# Patient Record
Sex: FEMALE | Race: BLACK OR AFRICAN AMERICAN | NOT HISPANIC OR LATINO
[De-identification: names, ages, dates, MRNs, and addresses within clinical notes are randomized per-mention and may not be internally consistent; named-entity substitution may affect disease eponyms.]

---

## 2017-09-01 ENCOUNTER — APPOINTMENT (OUTPATIENT)
Dept: OBGYN | Facility: CLINIC | Age: 52
End: 2017-09-01
Payer: COMMERCIAL

## 2017-09-01 VITALS
HEIGHT: 65 IN | RESPIRATION RATE: 20 BRPM | HEART RATE: 64 BPM | WEIGHT: 145 LBS | DIASTOLIC BLOOD PRESSURE: 90 MMHG | BODY MASS INDEX: 24.16 KG/M2 | SYSTOLIC BLOOD PRESSURE: 124 MMHG | OXYGEN SATURATION: 99 %

## 2017-09-01 PROCEDURE — 99396 PREV VISIT EST AGE 40-64: CPT

## 2017-09-07 LAB — HPV HIGH+LOW RISK DNA PNL CVX: NEGATIVE

## 2017-09-11 LAB — CYTOLOGY CVX/VAG DOC THIN PREP: NORMAL

## 2018-09-25 ENCOUNTER — APPOINTMENT (OUTPATIENT)
Dept: OBGYN | Facility: CLINIC | Age: 53
End: 2018-09-25
Payer: COMMERCIAL

## 2018-09-25 VITALS
WEIGHT: 150 LBS | OXYGEN SATURATION: 98 % | BODY MASS INDEX: 24.99 KG/M2 | DIASTOLIC BLOOD PRESSURE: 80 MMHG | HEART RATE: 60 BPM | SYSTOLIC BLOOD PRESSURE: 120 MMHG | HEIGHT: 65 IN

## 2018-09-25 PROCEDURE — 99396 PREV VISIT EST AGE 40-64: CPT

## 2018-09-27 LAB — HPV HIGH+LOW RISK DNA PNL CVX: NOT DETECTED

## 2018-09-30 LAB — CYTOLOGY CVX/VAG DOC THIN PREP: NORMAL

## 2019-07-24 ENCOUNTER — RESULT REVIEW (OUTPATIENT)
Age: 54
End: 2019-07-24

## 2019-07-29 ENCOUNTER — OTHER (OUTPATIENT)
Age: 54
End: 2019-07-29

## 2019-09-24 ENCOUNTER — APPOINTMENT (OUTPATIENT)
Dept: OBGYN | Facility: CLINIC | Age: 54
End: 2019-09-24
Payer: COMMERCIAL

## 2019-09-24 ENCOUNTER — TRANSCRIPTION ENCOUNTER (OUTPATIENT)
Age: 54
End: 2019-09-24

## 2019-09-24 VITALS
DIASTOLIC BLOOD PRESSURE: 70 MMHG | WEIGHT: 150 LBS | HEART RATE: 71 BPM | HEIGHT: 66 IN | BODY MASS INDEX: 24.11 KG/M2 | OXYGEN SATURATION: 98 % | SYSTOLIC BLOOD PRESSURE: 110 MMHG

## 2019-09-24 DIAGNOSIS — N95.2 POSTMENOPAUSAL ATROPHIC VAGINITIS: ICD-10-CM

## 2019-09-24 PROCEDURE — 99396 PREV VISIT EST AGE 40-64: CPT

## 2019-09-24 NOTE — PHYSICAL EXAM
[Awake] : awake [Alert] : alert [Oriented x3] : oriented to person, place, and time [No Lesions] : no genitalia lesions [Labia Majora] : labia major [Normal] : clitoris [Labia Minora] : labia minora [No Bleeding] : there was no active vaginal bleeding [Pap Obtained] : a Pap smear was performed [Soft] :  the cervix was soft [Retroversion] : retroverted [Uterine Adnexae] : were not tender and not enlarged [Acute Distress] : no acute distress [LAD] : no lymphadenopathy [Thyroid Nodule] : no thyroid nodule [Goiter] : no goiter [Mass] : no breast mass [Nipple Discharge] : no nipple discharge [Tender] : non tender [Axillary LAD] : no axillary lymphadenopathy [Distended] : not distended [Depressed Mood] : not depressed [H/Smegaly] : no hepatosplenomegaly [Flat Affect] : affect not flat [Vulvar Atrophy] : no vulvar atrophy [Labia Majora Erythema] : no erythema of the labia majora [Vulvitis] : no vulvitis [Labia Minora Erythema] : no erythema of the labia minora [Erythema] : no erythema [Atrophy] : no atrophy [Cystocele] : no cystocele [Rectocele] : no rectocele [Dry Mucosa] : moist mucosa [Uterine Prolapse] : no uterine prolapse [Discharge] : had no discharge [Erosion] : had no erosions [Motion Tenderness] : there was no cervical motion tenderness [Tenderness] : nontender [Enlarged ___ wks] : not enlarged [Mass ___ cm] : no uterine mass was palpated [Adnexa Tenderness] : were not tender [Ovarian Mass (___ Cm)] : there were no adnexal masses [FreeTextEntry9] : Last colonoscopy 2 - 3 years ago

## 2019-09-24 NOTE — HISTORY OF PRESENT ILLNESS
[Hot Flashes] : hot flashes [Normal Amount/Duration] : was of a normal amount and duration [Regular Cycle Intervals] : periods have been regular [Menarche Age: ____] : age at menarche was [unfilled] [Menstrual Cramps] : menstrual cramps [Menopause Age: ____] : age at menopause was [unfilled] [Sexually Active] : is sexually active [Monogamous] : is monogamous [Male ___] : [unfilled] male [Night Sweats] : no night sweats [Vaginal Itching] : no vaginal itching [Mood Changes] : no mood changes [Dyspareunia] : no dyspareunia [Headache] : no headache [Fatigue] : no fatigue [Weight Change] : no weight change [Irritability] : no irritability [Forgetfulness] : no forgetfulness [Loss of Libido] : no loss of libido [Depression] : no depression [Anxiety] : no anxiety [Spotting Between  Menses] : no spotting between menses

## 2019-09-25 LAB — HPV HIGH+LOW RISK DNA PNL CVX: NOT DETECTED

## 2019-10-02 LAB — CYTOLOGY CVX/VAG DOC THIN PREP: NORMAL

## 2020-10-26 DIAGNOSIS — R92.2 INCONCLUSIVE MAMMOGRAM: ICD-10-CM

## 2021-01-07 ENCOUNTER — APPOINTMENT (OUTPATIENT)
Dept: OBGYN | Facility: CLINIC | Age: 56
End: 2021-01-07
Payer: COMMERCIAL

## 2021-01-07 VITALS
BODY MASS INDEX: 24.11 KG/M2 | DIASTOLIC BLOOD PRESSURE: 70 MMHG | OXYGEN SATURATION: 98 % | HEIGHT: 66 IN | WEIGHT: 150 LBS | SYSTOLIC BLOOD PRESSURE: 110 MMHG | HEART RATE: 56 BPM | TEMPERATURE: 97.2 F

## 2021-01-07 PROCEDURE — 99072 ADDL SUPL MATRL&STAF TM PHE: CPT

## 2021-01-07 PROCEDURE — 99396 PREV VISIT EST AGE 40-64: CPT

## 2021-01-07 NOTE — PHYSICAL EXAM
[Appropriately responsive] : appropriately responsive [Alert] : alert [No Acute Distress] : no acute distress [No Lymphadenopathy] : no lymphadenopathy [Soft] : soft [Non-tender] : non-tender [No Lesions] : no lesions [No Mass] : no mass [Oriented x3] : oriented x3 [Examination Of The Breasts] : a normal appearance [No Discharge] : no discharge [No Masses] : no breast masses were palpable [Labia Majora] : normal [Labia Minora] : normal [Atrophy] : atrophy [No Bleeding] : There was no active vaginal bleeding [Normal] : normal [Tenderness] : nontender [Enlarged ___ wks] : enlarged [unfilled] ~Uweeks [Uterine Adnexae] : normal [FreeTextEntry9] : Last colonoscopy 6 years ago

## 2021-01-08 LAB — HPV HIGH+LOW RISK DNA PNL CVX: NOT DETECTED

## 2021-01-12 LAB — CYTOLOGY CVX/VAG DOC THIN PREP: ABNORMAL

## 2021-07-06 ENCOUNTER — NON-APPOINTMENT (OUTPATIENT)
Age: 56
End: 2021-07-06

## 2021-07-06 DIAGNOSIS — R14.0 ABDOMINAL DISTENSION (GASEOUS): ICD-10-CM

## 2022-01-05 ENCOUNTER — NON-APPOINTMENT (OUTPATIENT)
Age: 57
End: 2022-01-05

## 2022-01-13 ENCOUNTER — NON-APPOINTMENT (OUTPATIENT)
Age: 57
End: 2022-01-13

## 2022-01-13 ENCOUNTER — APPOINTMENT (OUTPATIENT)
Dept: OBGYN | Facility: CLINIC | Age: 57
End: 2022-01-13
Payer: COMMERCIAL

## 2022-01-13 VITALS
HEIGHT: 66 IN | DIASTOLIC BLOOD PRESSURE: 80 MMHG | OXYGEN SATURATION: 99 % | HEART RATE: 72 BPM | WEIGHT: 137 LBS | TEMPERATURE: 97.1 F | BODY MASS INDEX: 22.02 KG/M2 | SYSTOLIC BLOOD PRESSURE: 110 MMHG

## 2022-01-13 PROCEDURE — 99396 PREV VISIT EST AGE 40-64: CPT

## 2022-01-13 RX ORDER — ESTRADIOL 10 UG/1
10 TABLET, FILM COATED VAGINAL
Qty: 24 | Refills: 3 | Status: DISCONTINUED | COMMUNITY
Start: 2017-09-01 | End: 2022-01-13

## 2022-01-13 NOTE — HISTORY OF PRESENT ILLNESS
[FreeTextEntry1] : Check up Without complaint  Recent nephrotic syndrome, minimal  Nephrologist Dr Hernandez S/P Moderna vaccine 3/2021  Denies prior COVID infection\par \par S/P mammo /sono BIRAD 0  F/U studies pending [postmenopausal] : postmenopausal [N] : Patient is not sexually active [LMPDate] : age 46 [PGHxTotal] : 0

## 2022-01-13 NOTE — PHYSICAL EXAM
[Appropriately responsive] : appropriately responsive [Alert] : alert [No Acute Distress] : no acute distress [No Lymphadenopathy] : no lymphadenopathy [Non-tender] : non-tender [Non-distended] : non-distended [No HSM] : No HSM [No Lesions] : no lesions [No Mass] : no mass [Oriented x3] : oriented x3 [Examination Of The Breasts] : a normal appearance [No Discharge] : no discharge [No Masses] : no breast masses were palpable [Labia Majora] : normal [Labia Minora] : normal [No Bleeding] : There was no active vaginal bleeding [Soft] : soft [Normal] : normal [Tenderness] : nontender [Retroversion] : retroverted [Enlarged ___ wks] : enlarged [unfilled] ~Uweeks [Mass ___ cm] : no uterine mass was palpated [Uterine Adnexae] : normal [FreeTextEntry9] : Last colonoscopy

## 2022-01-14 LAB — HPV HIGH+LOW RISK DNA PNL CVX: NOT DETECTED

## 2022-01-24 LAB — CYTOLOGY CVX/VAG DOC THIN PREP: ABNORMAL

## 2022-07-29 ENCOUNTER — NON-APPOINTMENT (OUTPATIENT)
Age: 57
End: 2022-07-29

## 2023-01-19 ENCOUNTER — APPOINTMENT (OUTPATIENT)
Dept: OBGYN | Facility: CLINIC | Age: 58
End: 2023-01-19
Payer: COMMERCIAL

## 2023-01-19 VITALS
DIASTOLIC BLOOD PRESSURE: 72 MMHG | BODY MASS INDEX: 22.34 KG/M2 | OXYGEN SATURATION: 98 % | SYSTOLIC BLOOD PRESSURE: 111 MMHG | HEART RATE: 61 BPM | TEMPERATURE: 97.8 F | WEIGHT: 139 LBS | HEIGHT: 66 IN

## 2023-01-19 PROCEDURE — 99396 PREV VISIT EST AGE 40-64: CPT

## 2023-01-19 RX ORDER — ATORVASTATIN CALCIUM 10 MG/1
10 TABLET, FILM COATED ORAL
Refills: 0 | Status: DISCONTINUED | COMMUNITY
End: 2023-01-19

## 2023-01-19 NOTE — PHYSICAL EXAM
[Chaperone Present] : A chaperone was present in the examining room during all aspects of the physical examination [Appropriately responsive] : appropriately responsive [Alert] : alert [No Acute Distress] : no acute distress [No Murmurs] : no murmurs [Soft] : soft [Non-tender] : non-tender [Non-distended] : non-distended [No HSM] : No HSM [No Lesions] : no lesions [No Mass] : no mass [Oriented x3] : oriented x3 [Examination Of The Breasts] : a normal appearance [No Discharge] : no discharge [No Masses] : no breast masses were palpable [Labia Majora] : normal [Labia Minora] : normal [No Bleeding] : There was no active vaginal bleeding [Normal] : normal [Normal Position] : in a normal position [Tenderness] : nontender [Enlarged ___ wks] : enlarged [unfilled] ~Uweeks [Mass ___ cm] : no uterine mass was palpated [Uterine Adnexae] : normal [FreeTextEntry8] : Last colonoscopy 3 years ago

## 2023-01-19 NOTE — HISTORY OF PRESENT ILLNESS
[Y] : Patient is sexually active [Monogamous (Male Partner)] : is monogamous with a male partner [FreeTextEntry1] : CHeck up  Without complaint  Well since last visit S/P COVID 12/2022  S/P COVID vaccine X 2 ? minimal change disease  Nephrotic [LMPDate] : age 46 [PGHxTotal] : 0

## 2023-02-04 LAB
CYTOLOGY CVX/VAG DOC THIN PREP: ABNORMAL
HPV HIGH+LOW RISK DNA PNL CVX: NOT DETECTED

## 2023-06-07 DIAGNOSIS — Z00.00 ENCOUNTER FOR GENERAL ADULT MEDICAL EXAMINATION W/OUT ABNORMAL FINDINGS: ICD-10-CM

## 2023-07-18 ENCOUNTER — NON-APPOINTMENT (OUTPATIENT)
Age: 58
End: 2023-07-18

## 2024-01-08 ENCOUNTER — NON-APPOINTMENT (OUTPATIENT)
Age: 59
End: 2024-01-08

## 2024-01-11 ENCOUNTER — NON-APPOINTMENT (OUTPATIENT)
Age: 59
End: 2024-01-11

## 2024-01-29 ENCOUNTER — APPOINTMENT (OUTPATIENT)
Dept: OBGYN | Facility: CLINIC | Age: 59
End: 2024-01-29
Payer: COMMERCIAL

## 2024-01-29 VITALS
TEMPERATURE: 97.7 F | HEIGHT: 66 IN | BODY MASS INDEX: 22.18 KG/M2 | DIASTOLIC BLOOD PRESSURE: 70 MMHG | WEIGHT: 138 LBS | OXYGEN SATURATION: 98 % | HEART RATE: 60 BPM | SYSTOLIC BLOOD PRESSURE: 110 MMHG

## 2024-01-29 DIAGNOSIS — D25.9 LEIOMYOMA OF UTERUS, UNSPECIFIED: ICD-10-CM

## 2024-01-29 DIAGNOSIS — Z01.419 ENCOUNTER FOR GYNECOLOGICAL EXAMINATION (GENERAL) (ROUTINE) W/OUT ABNORMAL FINDINGS: ICD-10-CM

## 2024-01-29 PROCEDURE — 99396 PREV VISIT EST AGE 40-64: CPT

## 2024-01-29 RX ORDER — TACROLIMUS 0.5 MG/1
0.5 CAPSULE ORAL
Refills: 0 | Status: DISCONTINUED | COMMUNITY
End: 2024-01-29

## 2024-01-29 NOTE — HISTORY OF PRESENT ILLNESS
[FreeTextEntry1] : Check up  Without complaint S/P Lt breast Bx + for atypical ductal hyperplasia  Referred to Dr Palleschi for surgical consult  Otherwise well since last visit [Y] : Patient is sexually active [Monogamous (Male Partner)] : is monogamous with a male partner [LMPDate] : age 46 [PGHxTotal] : 0

## 2024-01-29 NOTE — PHYSICAL EXAM
[Chaperone Present] : A chaperone was present in the examining room during all aspects of the physical examination [FreeTextEntry1] : MELLY Shabazz [Appropriately responsive] : appropriately responsive [Alert] : alert [No Acute Distress] : no acute distress [No Lymphadenopathy] : no lymphadenopathy [No Murmurs] : no murmurs [Non-tender] : non-tender [Non-distended] : non-distended [No HSM] : No HSM [No Lesions] : no lesions [No Mass] : no mass [Oriented x3] : oriented x3 [Examination Of The Breasts] : a normal appearance [No Discharge] : no discharge [Diffuse Fibrous Tissue In The Left Breast] : fibrocystic changes [No Masses] : no breast masses were palpable [Labia Majora] : normal [Labia Minora] : normal [No Bleeding] : There was no active vaginal bleeding [Soft] : soft [Normal] : normal [Normal Position] : in a normal position [Tenderness] : nontender [Enlarged ___ wks] : enlarged [unfilled] ~Uweeks [Mass ___ cm] : no uterine mass was palpated [Uterine Adnexae] : normal [FreeTextEntry9] : Last colonoscopy 2023

## 2024-02-04 LAB
CYTOLOGY CVX/VAG DOC THIN PREP: NORMAL
HPV HIGH+LOW RISK DNA PNL CVX: NOT DETECTED

## 2024-02-12 NOTE — PHYSICAL EXAM
[Normocephalic] : normocephalic [Atraumatic] : atraumatic [EOMI] : extra ocular movement intact [Sclera nonicteric] : sclera nonicteric [Supple] : supple [No Supraclavicular Adenopathy] : no supraclavicular adenopathy [No Cervical Adenopathy] : no cervical adenopathy [No Thyromegaly] : no thyromegaly [Clear to Auscultation Bilat] : clear to auscultation bilaterally [Normal S1, S2] : normal S1 and S2 [No Gallops] : no gallops [No Rubs] : no pericardial rub [Examined in the supine and seated position] : examined in the supine and seated position [No dominant masses] : no dominant masses in right breast  [No dominant masses] : no dominant masses left breast [No Nipple Retraction] : no left nipple retraction [No Nipple Discharge] : no left nipple discharge [No Axillary Lymphadenopathy] : no left axillary lymphadenopathy [No Edema] : no edema [No Rashes] : no rashes [No Ulceration] : no ulceration

## 2024-02-26 NOTE — HISTORY OF PRESENT ILLNESS
[FreeTextEntry1] : The patient is a 58 year old female here to discuss newly biopsied left ADH. She was referred by Dr. Fermín Bearden (GYN) The patient has no family history of breast cancer.   6/29/2023 Bilateral Mammogram: ([facility]) Clinical Indication- Annual examination and short interval follow-up of probably benign left breast calcifications. No suspicious masses, calcifications or other findings are seen in either breast.  6/29/2023 Bilateral Breast Ultrasound: In the left breast at 2:00 there is 5mm clustered macrocysts. No suspicious abnormalities were seen sonographically in either breast. Overall Impression- Probably benign left breast calcifications, stable since 1/26/2022. Recommended continued short internal follow-up with left magnification views in 6 months. No mammographic evidence of malignancy in the right breast. No sonographic evidence of malignancy in either breast. Recommendation- A 6 month follow-up mammogram of the left breast is recommended. BI-RADS 3.  12/29/2023 Diagnostic Left Mammogram: ([facility]) Clinical Indication- Patient is having a short term follow up of left breast. Impression- Indeterminate calcifications in the upper outer and central left breast. A representative 2 site stereotactic guided biopsy is recommended. BI-RADS 4.  1/8/2024 Left stereotactic biopsies x 2- ([facility]): -9:00 (N8cm) with hourglass shaped clip placement: pathology reveals focal atypical ductal hyperplasia in a background of flat epithelial atypia (FEA) and columnar cell change with associated microcalcifications.  -2:00 (N11cm) with tophat shaped clip placement: pathology benign, columnar cell change with associated microcalcifications, PASH.  The pathology results are concordant with imaging findings. Recommendation- Surgical consultation of the left breast(s) is recommended.

## 2024-02-26 NOTE — ASSESSMENT
[FreeTextEntry1] : Left breast atypical ductal hyperplasia (ADH). I discussed with her that ADH is a risk factor for the presence of breast cancer now, as well as a risk factor for the presence of breast cancer in her future.    1. Advise left Dayna  localization breast biopsy to rule out a potential associated malignancy or further atypia. The procedure was reviewed with her in detail. The indications, alternatives, benefits and risks were discussed with her, including but not limited to bleeding, infection, pain, scar, swelling, numbness, change in breast appearance, and the potential need for additional surgery and further treatment. The breast biopsy and Dayna  booklets and post operative instructions were reviewed and provided to the patient. 2. Bilateral breast MRI with IV contrast: Advise a preop breast MRI for further evaluation 3. Medical oncologist: I discussed with the patient that I will refer her to a medical oncologist postoperatively to discuss taking an anti-hormonal medication such as Tamoxifen to reduce her potential risk of breast cancer in her future.  4. Annual bilateral mammogram and breast ultrasound due 6/2024

## 2024-02-27 ENCOUNTER — APPOINTMENT (OUTPATIENT)
Dept: PLASTIC SURGERY | Facility: CLINIC | Age: 59
End: 2024-02-27
Payer: COMMERCIAL

## 2024-02-27 VITALS
HEIGHT: 65 IN | OXYGEN SATURATION: 99 % | HEART RATE: 69 BPM | WEIGHT: 137 LBS | SYSTOLIC BLOOD PRESSURE: 150 MMHG | BODY MASS INDEX: 22.82 KG/M2 | DIASTOLIC BLOOD PRESSURE: 90 MMHG | TEMPERATURE: 98.2 F

## 2024-02-27 DIAGNOSIS — N60.92 UNSPECIFIED BENIGN MAMMARY DYSPLASIA OF LEFT BREAST: ICD-10-CM

## 2024-02-27 PROCEDURE — 99204 OFFICE O/P NEW MOD 45 MIN: CPT

## 2024-02-27 NOTE — CONSULT LETTER
[Dear  ___] : Dear  [unfilled], [Consult Letter:] : I had the pleasure of evaluating your patient, [unfilled]. [Please see my note below.] : Please see my note below. [Sincerely,] : Sincerely, [FreeTextEntry3] : Susan M. Palleschi, MD, FACS Division of Breast Surgery Director, Breast Surgery [address] [address] (Phone) [phone] (Fax) [phone]  [DrJohn  ___] : Dr. HUGHES

## 2024-03-04 ENCOUNTER — APPOINTMENT (OUTPATIENT)
Dept: MRI IMAGING | Facility: CLINIC | Age: 59
End: 2024-03-04
Payer: COMMERCIAL

## 2024-03-04 PROCEDURE — A9585: CPT

## 2024-03-04 PROCEDURE — 77049 MRI BREAST C-+ W/CAD BI: CPT

## 2024-03-06 ENCOUNTER — NON-APPOINTMENT (OUTPATIENT)
Age: 59
End: 2024-03-06

## 2024-03-14 ENCOUNTER — OUTPATIENT (OUTPATIENT)
Dept: OUTPATIENT SERVICES | Facility: HOSPITAL | Age: 59
LOS: 1 days | End: 2024-03-14
Payer: COMMERCIAL

## 2024-03-14 VITALS
OXYGEN SATURATION: 100 % | TEMPERATURE: 96 F | WEIGHT: 136.69 LBS | SYSTOLIC BLOOD PRESSURE: 133 MMHG | RESPIRATION RATE: 14 BRPM | HEART RATE: 59 BPM | HEIGHT: 65 IN | DIASTOLIC BLOOD PRESSURE: 84 MMHG

## 2024-03-14 DIAGNOSIS — N60.82 OTHER BENIGN MAMMARY DYSPLASIAS OF LEFT BREAST: ICD-10-CM

## 2024-03-14 DIAGNOSIS — N04.9 NEPHROTIC SYNDROME WITH UNSPECIFIED MORPHOLOGIC CHANGES: ICD-10-CM

## 2024-03-14 DIAGNOSIS — N60.89 OTHER BENIGN MAMMARY DYSPLASIAS OF UNSPECIFIED BREAST: ICD-10-CM

## 2024-03-14 DIAGNOSIS — Z01.818 ENCOUNTER FOR OTHER PREPROCEDURAL EXAMINATION: ICD-10-CM

## 2024-03-14 LAB
ANION GAP SERPL CALC-SCNC: 8 MMOL/L — SIGNIFICANT CHANGE UP (ref 5–17)
BUN SERPL-MCNC: 9 MG/DL — SIGNIFICANT CHANGE UP (ref 7–23)
CALCIUM SERPL-MCNC: 9.2 MG/DL — SIGNIFICANT CHANGE UP (ref 8.4–10.5)
CHLORIDE SERPL-SCNC: 104 MMOL/L — SIGNIFICANT CHANGE UP (ref 96–108)
CO2 SERPL-SCNC: 28 MMOL/L — SIGNIFICANT CHANGE UP (ref 22–31)
CREAT SERPL-MCNC: 0.76 MG/DL — SIGNIFICANT CHANGE UP (ref 0.5–1.3)
EGFR: 91 ML/MIN/1.73M2 — SIGNIFICANT CHANGE UP
GLUCOSE SERPL-MCNC: 83 MG/DL — SIGNIFICANT CHANGE UP (ref 70–99)
HCT VFR BLD CALC: 39.8 % — SIGNIFICANT CHANGE UP (ref 34.5–45)
HGB BLD-MCNC: 14.3 G/DL — SIGNIFICANT CHANGE UP (ref 11.5–15.5)
MCHC RBC-ENTMCNC: 32 PG — SIGNIFICANT CHANGE UP (ref 27–34)
MCHC RBC-ENTMCNC: 35.9 GM/DL — SIGNIFICANT CHANGE UP (ref 32–36)
MCV RBC AUTO: 89 FL — SIGNIFICANT CHANGE UP (ref 80–100)
NRBC # BLD: 0 /100 WBCS — SIGNIFICANT CHANGE UP (ref 0–0)
PLATELET # BLD AUTO: 232 K/UL — SIGNIFICANT CHANGE UP (ref 150–400)
POTASSIUM SERPL-MCNC: 4.2 MMOL/L — SIGNIFICANT CHANGE UP (ref 3.5–5.3)
POTASSIUM SERPL-SCNC: 4.2 MMOL/L — SIGNIFICANT CHANGE UP (ref 3.5–5.3)
RBC # BLD: 4.47 M/UL — SIGNIFICANT CHANGE UP (ref 3.8–5.2)
RBC # FLD: 11.6 % — SIGNIFICANT CHANGE UP (ref 10.3–14.5)
SODIUM SERPL-SCNC: 140 MMOL/L — SIGNIFICANT CHANGE UP (ref 135–145)
WBC # BLD: 4.01 K/UL — SIGNIFICANT CHANGE UP (ref 3.8–10.5)
WBC # FLD AUTO: 4.01 K/UL — SIGNIFICANT CHANGE UP (ref 3.8–10.5)

## 2024-03-14 PROCEDURE — G0463: CPT

## 2024-03-14 PROCEDURE — 80048 BASIC METABOLIC PNL TOTAL CA: CPT

## 2024-03-14 PROCEDURE — 36415 COLL VENOUS BLD VENIPUNCTURE: CPT

## 2024-03-14 PROCEDURE — 85027 COMPLETE CBC AUTOMATED: CPT

## 2024-03-14 NOTE — H&P PST ADULT - NSANTHOSAYNRD_GEN_A_CORE
neck 13.5inches/No. MELY screening performed.  STOP BANG Legend: 0-2 = LOW Risk; 3-4 = INTERMEDIATE Risk; 5-8 = HIGH Risk

## 2024-03-14 NOTE — H&P PST ADULT - WEIGHT IN KG
area.  A fever. You have pain or swelling in your neck or arm. You have trouble breathing. Watch closely for changes in your health, and be sure to contact your doctor if:    You have any problems with your line or port. You have received sedation medications today. YOU SHOULD NOT DRIVE FOR 24 HOURS, DO NOT OPERATE HEAVY MACHINERY, DO NOT MAKE ANY LEGAL DECISIONS OR SIGN LEGAL DOCUMENTS FOR 24 HOURS. DO NOT DRINK ALCOHOL, TAKE ANY MEDICATIONS UNLESS PRESCRIBED BY YOUR DOCTOR. IF YOU ARE A CAREGIVER, SOMEONE SHOULD TAKE THAT ROLE FOR 24 HOURS. Side effects of sedation medications and other medications used today have been reviewed  Those side effects can include but are not limited to: dizziness, drowsiness, poor balance, fatigue, sleepiness. Take precautions at home to prevent falls, such as assistance with walking or stairs if allowed and /or when needed or position changes. Allergic or adverse reactions could include nausea, itching, hives, dizziness, or anything else out of the ordinary. Should you experience any of these significant changes, please call [phone] between the hours of 7:30 am and 5:30 pm or [phone] after hours. After hours, ask the  to page the X-ray Technologist, and describe the problem to the technologist. If you are experiencing chest pain, shortness of breath, altered mental state, unusual bleeding or any other emergent symptom you should call 911 immediately.
62

## 2024-03-14 NOTE — H&P PST ADULT - NSICDXPASTMEDICALHX_GEN_ALL_CORE_FT
PAST MEDICAL HISTORY:  Nephrotic syndrome      PAST MEDICAL HISTORY:  Nephrotic syndrome     Other benign mammary dysplasia of left breast

## 2024-03-14 NOTE — H&P PST ADULT - HISTORY OF PRESENT ILLNESS
59yo female with medical h/o Nephrotic syndrome, reports Left breast dysplasia, presents today for PST foe scheduled Left Dayna  Localization Excisional Breast Biopsy on 3/27/2024 57yo female with medical h/o Nephrotic syndrome, reports annual mammogram showed Left breast dysplasia. Pt presents today for PST for scheduled Left Dayna  Localization Excisional Breast Biopsy on 3/27/2024

## 2024-03-14 NOTE — H&P PST ADULT - PROBLEM SELECTOR PLAN 1
Pre-op instructions given. Pt verbalized understanding  Chlorhexidine wash instructions given  Pt instructed to hold all NSAIDs + herbal supplements/vitamins 7 days before surgery  Pending: lab result  Copy ecg obtained -- wnl

## 2024-03-14 NOTE — H&P PST ADULT - ATTENDING COMMENTS
The patient is a 58 year old female who was recently diagnosed with left breast atypical ductal hyperplasia and flat epithelial atypia. She presents to undergo a left [place]  localization excisional breast biopsy.

## 2024-03-25 ENCOUNTER — APPOINTMENT (OUTPATIENT)
Dept: MAMMOGRAPHY | Facility: IMAGING CENTER | Age: 59
End: 2024-03-25
Payer: COMMERCIAL

## 2024-03-25 ENCOUNTER — OUTPATIENT (OUTPATIENT)
Dept: OUTPATIENT SERVICES | Facility: HOSPITAL | Age: 59
LOS: 1 days | End: 2024-03-25
Payer: COMMERCIAL

## 2024-03-25 DIAGNOSIS — N60.92 UNSPECIFIED BENIGN MAMMARY DYSPLASIA OF LEFT BREAST: ICD-10-CM

## 2024-03-25 PROCEDURE — 19281 PERQ DEVICE BREAST 1ST IMAG: CPT

## 2024-03-25 PROCEDURE — 19281 PERQ DEVICE BREAST 1ST IMAG: CPT | Mod: LT

## 2024-03-26 ENCOUNTER — NON-APPOINTMENT (OUTPATIENT)
Age: 59
End: 2024-03-26

## 2024-03-26 ENCOUNTER — TRANSCRIPTION ENCOUNTER (OUTPATIENT)
Age: 59
End: 2024-03-26

## 2024-03-27 ENCOUNTER — APPOINTMENT (OUTPATIENT)
Dept: PLASTIC SURGERY | Facility: HOSPITAL | Age: 59
End: 2024-03-27
Payer: COMMERCIAL

## 2024-03-27 ENCOUNTER — OUTPATIENT (OUTPATIENT)
Dept: OUTPATIENT SERVICES | Facility: HOSPITAL | Age: 59
LOS: 1 days | End: 2024-03-27
Payer: COMMERCIAL

## 2024-03-27 ENCOUNTER — TRANSCRIPTION ENCOUNTER (OUTPATIENT)
Age: 59
End: 2024-03-27

## 2024-03-27 ENCOUNTER — RESULT REVIEW (OUTPATIENT)
Age: 59
End: 2024-03-27

## 2024-03-27 VITALS
RESPIRATION RATE: 16 BRPM | DIASTOLIC BLOOD PRESSURE: 72 MMHG | TEMPERATURE: 98 F | OXYGEN SATURATION: 99 % | SYSTOLIC BLOOD PRESSURE: 112 MMHG | HEART RATE: 58 BPM

## 2024-03-27 VITALS
SYSTOLIC BLOOD PRESSURE: 116 MMHG | WEIGHT: 136.69 LBS | HEART RATE: 76 BPM | HEIGHT: 65 IN | RESPIRATION RATE: 13 BRPM | DIASTOLIC BLOOD PRESSURE: 78 MMHG | TEMPERATURE: 98 F | OXYGEN SATURATION: 100 %

## 2024-03-27 DIAGNOSIS — N60.82 OTHER BENIGN MAMMARY DYSPLASIAS OF LEFT BREAST: ICD-10-CM

## 2024-03-27 DIAGNOSIS — N60.89 OTHER BENIGN MAMMARY DYSPLASIAS OF UNSPECIFIED BREAST: ICD-10-CM

## 2024-03-27 PROCEDURE — 76098 X-RAY EXAM SURGICAL SPECIMEN: CPT

## 2024-03-27 PROCEDURE — 19301 PARTIAL MASTECTOMY: CPT | Mod: LT

## 2024-03-27 PROCEDURE — 88307 TISSUE EXAM BY PATHOLOGIST: CPT | Mod: 26

## 2024-03-27 PROCEDURE — 19125 EXCISION BREAST LESION: CPT | Mod: LT

## 2024-03-27 PROCEDURE — 88360 TUMOR IMMUNOHISTOCHEM/MANUAL: CPT

## 2024-03-27 PROCEDURE — 88307 TISSUE EXAM BY PATHOLOGIST: CPT

## 2024-03-27 PROCEDURE — 88360 TUMOR IMMUNOHISTOCHEM/MANUAL: CPT | Mod: 26

## 2024-03-27 RX ORDER — ONDANSETRON 8 MG/1
4 TABLET, FILM COATED ORAL ONCE
Refills: 0 | Status: DISCONTINUED | OUTPATIENT
Start: 2024-03-27 | End: 2024-03-27

## 2024-03-27 RX ORDER — LISINOPRIL 2.5 MG/1
2 TABLET ORAL
Refills: 0 | DISCHARGE

## 2024-03-27 RX ORDER — SODIUM CHLORIDE 9 MG/ML
1000 INJECTION, SOLUTION INTRAVENOUS
Refills: 0 | Status: DISCONTINUED | OUTPATIENT
Start: 2024-03-27 | End: 2024-03-27

## 2024-03-27 RX ORDER — LISINOPRIL 2.5 MG/1
1 TABLET ORAL
Refills: 0 | DISCHARGE

## 2024-03-27 RX ORDER — OXYCODONE HYDROCHLORIDE 5 MG/1
5 TABLET ORAL ONCE
Refills: 0 | Status: DISCONTINUED | OUTPATIENT
Start: 2024-03-27 | End: 2024-03-27

## 2024-03-27 RX ORDER — FENTANYL CITRATE 50 UG/ML
25 INJECTION INTRAVENOUS
Refills: 0 | Status: DISCONTINUED | OUTPATIENT
Start: 2024-03-27 | End: 2024-03-27

## 2024-03-27 RX ADMIN — SODIUM CHLORIDE 50 MILLILITER(S): 9 INJECTION, SOLUTION INTRAVENOUS at 12:58

## 2024-03-27 NOTE — BRIEF OPERATIVE NOTE - NSICDXBRIEFPOSTOP_GEN_ALL_CORE_FT
POST-OP DIAGNOSIS:  Atypical ductal hyperplasia of left breast 27-Mar-2024 17:26:20  Palleschi, Susan M  Flat epithelial atypia of left breast 27-Mar-2024 17:26:34  Palleschi, Susan M

## 2024-03-27 NOTE — ASU DISCHARGE PLAN (ADULT/PEDIATRIC) - CARE PROVIDER_API CALL
Palleschi, Susan Diana  [address], [address]  [place] [address]  Phone: [phone]  Fax: [phone]  Follow Up Time: 2 weeks

## 2024-03-27 NOTE — ASU DISCHARGE PLAN (ADULT/PEDIATRIC) - ASU DC SPECIAL INSTRUCTIONSFT
You have purple surgical glue over your incision, this will wear away over the next week on its own and helps to keep your incision closed and clean. You may shower tomorrow but do not scrub away glue. Let water run over surgical incision and pat dry.    For pain take Extra Strength Tylenol 500mg 2 tablets every 6 hours as needed for pain    Wear supportive bra day and night for 7-10 days until post op visit. No heavy lifting or gym workout until cleared by surgeon.    Follow up in 2 weeks with Dr. Palleschi, call office to make/confirm appointment

## 2024-03-27 NOTE — BRIEF OPERATIVE NOTE - NSICDXBRIEFPREOP_GEN_ALL_CORE_FT
PRE-OP DIAGNOSIS:  Atypical ductal hyperplasia of left breast 27-Mar-2024 17:25:56  Palleschi, Susan M  Flat epithelial atypia of left breast 27-Mar-2024 17:26:11  Palleschi, Susan M

## 2024-03-27 NOTE — ASU PATIENT PROFILE, ADULT - FALL HARM RISK - UNIVERSAL INTERVENTIONS
Bed in lowest position, wheels locked, appropriate side rails in place/Call bell, personal items and telephone in reach/Instruct patient to call for assistance before getting out of bed or chair/Non-slip footwear when patient is out of bed/[place] to call system/Physically safe environment - no spills, clutter or unnecessary equipment/Purposeful Proactive Rounding/Room/bathroom lighting operational, light cord in reach

## 2024-03-27 NOTE — ASU DISCHARGE PLAN (ADULT/PEDIATRIC) - ACTIVITY LEVEL
No heavy lifting/No sports/gym
Alert-The patient is alert, awake and responds to voice. The patient is oriented to time, place, and person. The triage nurse is able to obtain subjective information.

## 2024-03-27 NOTE — ASU DISCHARGE PLAN (ADULT/PEDIATRIC) - NS MD DC FALL RISK RISK
For information on Fall & Injury Prevention, visit: [email or web address] OR  [email or web address] OR  https://www.cdc.gov/steadi/patient.html

## 2024-03-28 ENCOUNTER — NON-APPOINTMENT (OUTPATIENT)
Age: 59
End: 2024-03-28

## 2024-03-28 PROCEDURE — 76098 X-RAY EXAM SURGICAL SPECIMEN: CPT | Mod: 26

## 2024-04-03 ENCOUNTER — NON-APPOINTMENT (OUTPATIENT)
Age: 59
End: 2024-04-03

## 2024-04-03 LAB — SURGICAL PATHOLOGY STUDY: SIGNIFICANT CHANGE UP

## 2024-04-04 ENCOUNTER — NON-APPOINTMENT (OUTPATIENT)
Age: 59
End: 2024-04-04

## 2024-04-05 ENCOUNTER — NON-APPOINTMENT (OUTPATIENT)
Age: 59
End: 2024-04-05

## 2024-04-09 ENCOUNTER — APPOINTMENT (OUTPATIENT)
Dept: PLASTIC SURGERY | Facility: CLINIC | Age: 59
End: 2024-04-09
Payer: COMMERCIAL

## 2024-04-09 ENCOUNTER — NON-APPOINTMENT (OUTPATIENT)
Age: 59
End: 2024-04-09

## 2024-04-09 VITALS
DIASTOLIC BLOOD PRESSURE: 88 MMHG | WEIGHT: 137 LBS | OXYGEN SATURATION: 100 % | TEMPERATURE: 97.9 F | BODY MASS INDEX: 22.82 KG/M2 | HEART RATE: 69 BPM | HEIGHT: 65 IN | SYSTOLIC BLOOD PRESSURE: 139 MMHG

## 2024-04-09 PROCEDURE — 99024 POSTOP FOLLOW-UP VISIT: CPT

## 2024-04-09 NOTE — CONSULT LETTER
[Dear  ___] : Dear  [unfilled], [Consult Letter:] : I had the pleasure of evaluating your patient, [unfilled]. [Please see my note below.] : Please see my note below. [Consult Closing:] : Thank you very much for allowing me to participate in the care of this patient.  If you have any questions, please do not hesitate to contact me. [Sincerely,] : Sincerely, [DrJohn  ___] : Dr. HUGHES [FreeTextEntry3] : Susan M. Palleschi, MD, FACS Division of Breast Surgery Director, Breast Surgery [address] [address] (Phone) [phone] (Fax) [phone]

## 2024-04-09 NOTE — ASSESSMENT
[FreeTextEntry1] : S/P left 9:00 excisional breast biopsy for ADH with final pathology revealing DCIS, ER+/ME+. Margins close but negative.  I had an extensive discussion with the patient informing her that this is a Stage 0 cancer which has an excellent prognosis with appropriate treatment.   1. Pathology reviewed with patient, copy provided 2. The surgical treatment options of a left completion mastectomy vs. bilateral total mastectomies were discussed in detail. The indications, alternatives, benefits and risks were discussed, including but not limited to bleeding, infection, pain, scar, swelling, numbness and change in breast appearance. The breast cancer booklet was reviewed and provided to the patient. She would prefer to continue with breast conservation. 3.. Genetic testing: She would like to meet with [facility] cancer genetics for a consultation and more information 4. Reconstruction: discussed with patient option of reconstruction if she undergoes mastectomy(ies).  5. Medical Oncology: I will refer her to Miguel 6. Radiation Oncology: I will refer her to [facility]. 7. Follow up office visit due 6/2024 8. Advised monthly self breast examinations and advised her to contact me if she has any concerns. 9. Annual bilateral mammogram and breast ultrasound due 6-7/2024.

## 2024-04-09 NOTE — PHYSICAL EXAM
[Normocephalic] : normocephalic [Atraumatic] : atraumatic [EOMI] : extra ocular movement intact [Sclera nonicteric] : sclera nonicteric [Supple] : supple [Examined in the supine and seated position] : examined in the supine and seated position [No dominant masses] : no dominant masses left breast [No Nipple Retraction] : no left nipple retraction [No Nipple Discharge] : no left nipple discharge [de-identified] : Incision C/D/I, no erythema or warmth, no evidence of infection, no palpable seroma/hematoma.

## 2024-04-09 NOTE — HISTORY OF PRESENT ILLNESS
[FreeTextEntry1] : The patient is a 58 year old female here for a post op appt (Date of surgery: 3/27/2024).  She was referred by Dr. Fermín Bearden (GYN) She has no family history of breast or ovarian cancer. 6/29/2023 Bilateral Mammogram: ([facility]) Clinical Indication- Annual examination and short interval follow-up of probably benign left breast calcifications. No suspicious masses, calcifications or other findings are seen in either breast. 6/29/2023 Bilateral Breast Ultrasound: In the left breast at 2:00 there is 5mm clustered macrocysts. No suspicious abnormalities were seen sonographically in either breast. Overall Impression- Probably benign left breast calcifications, stable since 1/26/2022. Recommended continued short internal follow-up with left magnification views in 6 months. No mammographic evidence of malignancy in the right breast. No sonographic evidence of malignancy in either breast. Recommendation- A 6 month follow-up mammogram of the left breast is recommended. BI-RADS 3. 12/29/2023 Diagnostic Left Mammogram: ([facility]) Clinical Indication- Patient is having a short term follow up of left breast. Impression- Indeterminate calcifications in the upper outer and central left breast. A representative 2 site stereotactic guided biopsy is recommended. BI-RADS 4. 1/8/2024 Left stereotactic biopsies x 2- ([facility]): -9:00 (N8cm) with hourglass shaped clip placement: pathology reveals focal atypical ductal hyperplasia in a background of flat epithelial atypia (FEA) and columnar cell change with associated microcalcifications. -2:00 (N11cm) with tophat shaped clip placement: pathology benign, columnar cell change with associated microcalcifications, PASH. The pathology results are concordant with imaging findings. Recommendation- Surgical consultation of the left breast(s) is recommended. 3/4/2024 Bilateral Breast MRI: ([facility]) Clinical Indication- 58 years old female status post two benign stereotactic biopsies of calcifications in the left breast in January 2024 at 9:00 8 cm from the nipple revealing ADH and FEA (hourglass shaped clip) and at 2:00 11 cm from the nipple revealing columnar cell change and PASH (top hat shaped clip). Patient now presents for breast MRI for presurgical planning. No personal or family history of breast cancer. Right- The breast parenchyma is composed of heterogenous fibroglandular tissue.   The breasts demonstrate moderate background parenchymal enhancement. This lowers the sensitivity of the breast MRI for detection of small enhancing lesions. There are numerous scattered enhancing nonspecific foci.  There is no suspicious enhancement in the right breast. Left- Biopsy clip susceptibility artifact in the posterior inner left breast with adjacent progressive non-mass enhancement measuring approximately 1.5 cm and corresponding to the site of biopsy-proven ADH and FEA marked by an hourglass-shaped clip. Biopsy clip susceptibility artifact in the posterior upper outer left breast with associated nonmass enhancement measuring approximately 1.5 cm and corresponding to the site of the benign biopsy marked by a top hat-shaped clip. There are numerous scattered enhancing nonspecific foci.  There is no suspicious enhancement in the left breast. Axilla/other- There is no significant axillary or internal mammary lymphadenopathy. Impression- Moderate background parenchymal enhancement with bilateral enhancing non-specific foci. No suspicious enhancement visualized in either breast. Biopsy clip in the posterior inner left breast with adjacent 1.5 cm non-mass enhancement, corresponding to the site of biopsy-proven ADH and FEA marked by an hourglass-shaped clip. Continued surgical management is recommended. Biopsy clip in the posterior upper outer left breast corresponding to a benign biopsy site with associated 1.5 cm nonmass enhancement. Recommendation- Surgical or oncologic management. BI-RADS 2.  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 The DCIS measures 3.5 mm in greatest dimension and is present in 3 of 12 slides. No invasive carcinoma or lymphovascular invasion identified. The resection margins are negative for carcinoma. DCIS is 5.0mm from the nearest margin (anterior). Atypical ductal hyperplasia. Focal atypical lobular hyperplasia. Microscopic mucocele-like lesion, completely excised. Fibrocystic changes, columnar cell change, usual ductal hyperplasia, apocrine metaplasia and benign epithelial calcifications. Biopsy site changes with focus consistent with displaced squamous epithelium. ER+(%)/KY+(%) Path addendum: Upon microscopic evaluation of the remaining tissue sections, 4 of 10 additional slides contain minute foci of DCIS.  DCIS is present in 7 of 22 sites.  One of the additional foci of DCIS is 0.3 cm from the nearest medial margin. She came alone but her boyfriend Jann was on speakerphone She is feeling well

## 2024-04-11 ENCOUNTER — OUTPATIENT (OUTPATIENT)
Dept: OUTPATIENT SERVICES | Facility: HOSPITAL | Age: 59
LOS: 1 days | Discharge: HOME | End: 2024-04-11

## 2024-04-11 DIAGNOSIS — C50.919 MALIGNANT NEOPLASM OF UNSPECIFIED SITE OF UNSPECIFIED FEMALE BREAST: ICD-10-CM

## 2024-04-16 ENCOUNTER — NON-APPOINTMENT (OUTPATIENT)
Age: 59
End: 2024-04-16

## 2024-04-16 NOTE — HISTORY OF PRESENT ILLNESS
[Disease: _____________________] : Disease: [unfilled] [T: ___] : T[unfilled] [N: ___] : N[unfilled] [M: ___] : M[unfilled] [AJCC Stage: ____] : AJCC Stage: [unfilled]

## 2024-04-17 ENCOUNTER — APPOINTMENT (OUTPATIENT)
Dept: HEMATOLOGY ONCOLOGY | Facility: CLINIC | Age: 59
End: 2024-04-17
Payer: COMMERCIAL

## 2024-04-17 VITALS
HEIGHT: 65 IN | RESPIRATION RATE: 18 BRPM | TEMPERATURE: 98.7 F | SYSTOLIC BLOOD PRESSURE: 130 MMHG | OXYGEN SATURATION: 99 % | WEIGHT: 134.48 LBS | DIASTOLIC BLOOD PRESSURE: 85 MMHG | BODY MASS INDEX: 22.41 KG/M2 | HEART RATE: 67 BPM

## 2024-04-17 DIAGNOSIS — N04.0 NEPHROTIC SYNDROME WITH MINOR GLOMERULAR ABNORMALITY: ICD-10-CM

## 2024-04-17 DIAGNOSIS — Z87.898 PERSONAL HISTORY OF OTHER SPECIFIED CONDITIONS: ICD-10-CM

## 2024-04-17 DIAGNOSIS — Z12.39 ENCOUNTER FOR OTHER SCREENING FOR MALIGNANT NEOPLASM OF BREAST: ICD-10-CM

## 2024-04-17 DIAGNOSIS — M85.80 OTHER SPECIFIED DISORDERS OF BONE DENSITY AND STRUCTURE, UNSPECIFIED SITE: ICD-10-CM

## 2024-04-17 DIAGNOSIS — Z78.9 OTHER SPECIFIED HEALTH STATUS: ICD-10-CM

## 2024-04-17 PROCEDURE — 99205 OFFICE O/P NEW HI 60 MIN: CPT

## 2024-04-17 RX ORDER — CHLORHEXIDINE GLUCONATE 4 %
LIQUID (ML) TOPICAL DAILY
Refills: 0 | Status: ACTIVE | COMMUNITY
Start: 2024-04-17

## 2024-04-17 RX ORDER — LISINOPRIL 5 MG/1
5 TABLET ORAL DAILY
Refills: 0 | Status: ACTIVE | COMMUNITY

## 2024-04-17 RX ORDER — ANASTROZOLE TABLETS 1 MG/1
1 TABLET ORAL
Qty: 30 | Refills: 5 | Status: ACTIVE | COMMUNITY
Start: 2024-04-17 | End: 1900-01-01

## 2024-04-17 NOTE — DISCUSSION/SUMMARY
[FreeTextEntry1] : REASON FOR CONSULT Tevin Ignacio is a 58-year-old female who was referred by Dr. Susan Palleschi for cancer genetic counseling and risk assessment due to a recent diagnosis of breast cancer.   RELEVANT MEDICAL HISTORY Ms. Ignacio was diagnosed with left breast cancer (DCIS, ER/AK+) on excisional biopsy 03/27/2024 at age 58. Ms. Ignacio met with Oncologist, Dr. Taniya Monotya, today and reported the plan is to take Arimidex once she has met with radiation oncologist, Dr. Tiara Cash, on 04/22/2024 to determine if radiation therapy is needed.   OTHER MEDICAL AND SURGICAL HISTORY: Nephrotic syndrome diagnosed 2021. Uterine fibroids.   PAST OB/GYN HISTORY: Obstetrical History: G0 Age at Menarche: 11/13 Menopausal with LMP at age 46 Age at First Live Birth: N/A Oral Contraceptive Use: Yes, approximately 15 years.  Hormone Replacement Therapy: No  CANCER SCREENING HISTORY:   GYN: Last visit 01/29/2024, uterine fibroids and referred to breast surgeon due to breast findings, see above. Frequency: yearly.  Colon: Last colonoscopy 07/2023, two noncancerous polyps reported. No polyps reported on previous colonoscopy. Frequency: every 5 years.  Skin: Last exam over 5 years ago, no biopsies/concerns reported.   SOCIAL HISTORY: -	Tobacco-product use: No  FAMILY HISTORY: Maternal and paternal ancestry was reported as [place]. A detailed family history of cancer was ascertained. Relevant diagnoses are detailed below and in the scanned pedigree.   To Ms. Lorenzo knowledge, no one in the family has had germline testing for cancer susceptibility.   	 	RISK ASSESSMENT: The history reported is not highly suggestive of a hereditary predisposition to cancer given Ms. Lorenzo age at diagnosis and no family history of breast, ovarian, pancreatic, and other concerning cancers. Therefore, the likelihood of identifying a cancer predisposing gene mutation is low, and genetic testing is unlikely to provide any information as to the cause of Ms. Lorenzo personal or family history of cancer. We discussed the various national testing guidelines and reviewed that some societies recommend genetic testing for all women with a newly diagnosed breast cancer. We therefore offered the option of genetic testing for genes associated with breast/gyn cancers. However, Ms. Lorenzo was reassured by our discussion and chose to defer genetic testing until she has completed treatment for her current diagnosis. Ms. Ignacio was provided our contact information should she choose to pursue genetic testing in the future.   PLAN:  1.	Ms. Ignacio declined genetic testing today.  2.	Ms. Ignacio will contact our team should she choose to pursue genetic testing in the future once she has completed her breast cancer treatment.   For any additional questions please call Cancer Genetics at [phone].    Andres Mao MS, [facility] [place] Counsel[place], Cancer Genetics   CC:  Dr. Susan Palleschi

## 2024-04-17 NOTE — CONSULT LETTER
[Dear  ___] : Dear  [unfilled], [( Thank you for referring [unfilled] for consultation for _____ )] : Thank you for referring [unfilled] for consultation for [unfilled] [Please see my note below.] : Please see my note below. [Consult Closing:] : Thank you very much for allowing me to participate in the care of this patient.  If you have any questions, please do not hesitate to contact me. [Sincerely,] : Sincerely, [DrJohn  ___] : Dr. HUGHES [DrJohn ___] : Dr. HUGHES

## 2024-04-19 ENCOUNTER — APPOINTMENT (OUTPATIENT)
Dept: PLASTIC SURGERY | Facility: CLINIC | Age: 59
End: 2024-04-19

## 2024-04-22 ENCOUNTER — APPOINTMENT (OUTPATIENT)
Dept: RADIATION ONCOLOGY | Facility: CLINIC | Age: 59
End: 2024-04-22
Payer: COMMERCIAL

## 2024-04-22 VITALS
HEART RATE: 64 BPM | DIASTOLIC BLOOD PRESSURE: 85 MMHG | TEMPERATURE: 95.5 F | WEIGHT: 134.37 LBS | SYSTOLIC BLOOD PRESSURE: 137 MMHG | RESPIRATION RATE: 16 BRPM | BODY MASS INDEX: 22.36 KG/M2 | OXYGEN SATURATION: 100 %

## 2024-04-22 PROCEDURE — 99205 OFFICE O/P NEW HI 60 MIN: CPT | Mod: GC

## 2024-04-22 NOTE — PHYSICAL EXAM
[Sclera] : the sclera and conjunctiva were normal [Heart Rate And Rhythm] : heart rate and rhythm were normal [Normal] : no palpable adenopathy [Supraclavicular Lymph Nodes Enlarged Bilaterally] : supraclavicular [Axillary Lymph Nodes Enlarged Bilaterally] : axillary [Skin Color & Pigmentation] : normal skin color and pigmentation [No Focal Deficits] : no focal deficits [Affect] : the affect was normal [Not Anxious] : not anxious [Extraocular Movements] : extraocular movements were intact [Abdomen Soft] : soft [Nondistended] : nondistended [Musculoskeletal - Swelling] : no joint swelling [Nail Clubbing] : no clubbing  or cyanosis of the fingernails [] : no rash [Sensation] : the sensory exam was normal to light touch and pinprick [de-identified] : There is a well healing scar medial circumareolar incision, C/D/I The breasts are without palpable masses, nipple discharge or suspicious skin changes.

## 2024-04-22 NOTE — VITALS
[Maximal Pain Intensity: 0/10] : 0/10 [90: Able to carry normal activity; minor signs or symptoms of disease.] : 90: Able to carry normal activity; minor signs or symptoms of disease.  [ECOG Performance Status: 0 - Fully active, able to carry on all pre-disease performance without restriction] : Performance Status: 0 - Fully active, able to carry on all pre-disease performance without restriction [Date: ____________] : Patient's last distress assessment performed on [unfilled]. [1 - Distress Level] : Distress Level: 1

## 2024-04-22 NOTE — HISTORY OF PRESENT ILLNESS
[FreeTextEntry1] : Ms. Ignacio is a 57yo F with ER/KY+ DCIS of the left breast, Grade 2 s/p excisional biopsy with negative margins, closest 5mm anteriorly, pTis, Stage 0. Patient presents to discuss radiation therapy.  Brief Oncological History: PMH: Nephrotic syndrome diagnosed 2021.  6/29/2023 - Bilateral Mammogram: ([facility]) - Annual examination and short interval follow-up of probably benign left breast calcifications. No suspicious masses, calcifications or other findings are seen in either breast.  6/29/2023 - Bilateral Breast in the left breast at 2:00 there is 5mm clustered macrocysts. No suspicious abnormalities were seen sonographically in either breast. Probably benign left breast calcifications, stable since 1/26/202. BI-RADS 3.  12/29/2023 - Diagnostic Left Mammogram: ([facility]) - Indeterminate calcifications in the upper outer and central left breast. A representative 2 site stereotactic guided biopsy is recommended. BI-RADS 4.  1/8/2024 - Left stereotactic biopsies x 2- ([facility]): -9:00 (N8cm) with hourglass shaped clip placement: pathology reveals focal atypical ductal hyperplasia in a background of flat epithelial atypia (FEA) and columnar cell change with associated microcalcifications. -2:00 (N11cm) with tophat shaped clip placement: pathology benign, columnar cell change with associated microcalcifications, PASH.  3/4/2024 - Bilateral Breast MRI ([facility]) showing no suspicious enhancement in the right breast. Biopsy clip susceptibility artifact in the posterior inner left breast with adjacent progressive non-mass enhancement measuring approximately 1.5 cm and corresponding to the site of biopsy-proven ADH and FEA marked by an hourglass-shaped clip. Biopsy clip susceptibility artifact in the posterior upper outer left breast with associated nonmass enhancement measuring approximately 1.5 cm and corresponding to the site of the benign biopsy marked by a top hat-shaped clip. There are numerous scattered enhancing nonspecific foci. There is no suspicious enhancement in the left breast. There is no significant axillary or internal mammary lymphadenopathy.  3/27/2024 - She underwent Left 9:00 excisional breast biopsy by Dr. 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 The DCIS measures 3.5 mm in greatest dimension and is present in 3 of 12 slides. No invasive carcinoma or lymphovascular invasion identified. The resection margins are negative for carcinoma. DCIS is 5.0mm from the nearest margin (anterior). Atypical ductal hyperplasia. Focal atypical lobular hyperplasia. ER %, KY %. Path addendum: Upon microscopic evaluation of the remaining tissue sections, 4 of 10 additional slides contain minute foci of DCIS. DCIS is present in 7 of 22 sites. One of the additional foci of DCIS is 0.3 cm from the nearest medial margin. pTisNx.  ER %, KY %     4/17/2024 - Saw Dr. Montoya, favors hormone therapy. Also met with cancer genetics, declined genetic testing.  Today here for RT consultation, has no complaints, denies pain, recovered well from surgery.  Denies pain, swelling, or reduced ROM.

## 2024-04-29 ENCOUNTER — NON-APPOINTMENT (OUTPATIENT)
Age: 59
End: 2024-04-29

## 2024-06-25 ENCOUNTER — APPOINTMENT (OUTPATIENT)
Dept: PLASTIC SURGERY | Facility: CLINIC | Age: 59
End: 2024-06-25
Payer: COMMERCIAL

## 2024-06-25 VITALS
DIASTOLIC BLOOD PRESSURE: 83 MMHG | WEIGHT: 134 LBS | OXYGEN SATURATION: 99 % | BODY MASS INDEX: 22.33 KG/M2 | HEIGHT: 65 IN | TEMPERATURE: 98 F | SYSTOLIC BLOOD PRESSURE: 144 MMHG | HEART RATE: 61 BPM

## 2024-06-25 DIAGNOSIS — D05.12 INTRADUCTAL CARCINOMA IN SITU OF LEFT BREAST: ICD-10-CM

## 2024-06-25 PROCEDURE — 99024 POSTOP FOLLOW-UP VISIT: CPT

## 2024-06-25 NOTE — ASSESSMENT
[FreeTextEntry1] : S/P left 9:00 excisional breast biopsy for ADH with final pathology revealing DCIS, ER+/TN+. Margins close but negative.    1. Follow up with Dr. Montoya. 2. Follow up office visit due 10/2024 3. Advised monthly self breast examinations and advised her to contact me if she has any concerns. 4. Annual bilateral mammogram and breast ultrasound due 7/2024.

## 2024-06-25 NOTE — PHYSICAL EXAM
[Normocephalic] : normocephalic [Atraumatic] : atraumatic [EOMI] : extra ocular movement intact [Sclera nonicteric] : sclera nonicteric [Supple] : supple [Examined in the supine and seated position] : examined in the supine and seated position [No dominant masses] : no dominant masses left breast [No Nipple Retraction] : no left nipple retraction [No Nipple Discharge] : no left nipple discharge [de-identified] : Incision C/D/I, no erythema or warmth, no evidence of infection, no palpable seroma/hematoma.

## 2024-06-25 NOTE — CONSULT LETTER
[Dear  ___] : Dear  [unfilled], [Consult Letter:] : I had the pleasure of evaluating your patient, [unfilled]. [Please see my note below.] : Please see my note below. [Consult Closing:] : Thank you very much for allowing me to participate in the care of this patient.  If you have any questions, please do not hesitate to contact me. [Sincerely,] : Sincerely, [DrJohn  ___] : Dr. HUGHES [FreeTextEntry3] : Lakeisha Weber, RPA-C Breast Surgery [address] [place] [address], [place] [address] (Phone) [phone] (Fax) [phone]

## 2024-06-25 NOTE — HISTORY OF PRESENT ILLNESS
[FreeTextEntry1] : The patient is a 58 year old female here for a follow up visit. She has no family history of breast or ovarian cancer. 6/29/2023 Bilateral Mammogram: ([facility]) Clinical Indication- Annual examination and short interval follow-up of probably benign left breast calcifications. No suspicious masses, calcifications or other findings are seen in either breast. 6/29/2023 Bilateral Breast Ultrasound: In the left breast at 2:00 there is 5mm clustered macrocysts. No suspicious abnormalities were seen sonographically in either breast. Overall Impression- Probably benign left breast calcifications, stable since 1/26/2022. Recommended continued short internal follow-up with left magnification views in 6 months. No mammographic evidence of malignancy in the right breast. No sonographic evidence of malignancy in either breast. Recommendation- A 6 month follow-up mammogram of the left breast is recommended. BI-RADS 3. 12/29/2023 Diagnostic Left Mammogram: ([facility]) Clinical Indication- Patient is having a short term follow up of left breast. Impression- Indeterminate calcifications in the upper outer and central left breast. A representative 2 site stereotactic guided biopsy is recommended. BI-RADS 4. 1/8/2024 Left stereotactic biopsies x 2- ([facility]): -9:00 (N8cm) with hourglass shaped clip placement: pathology reveals focal atypical ductal hyperplasia in a background of flat epithelial atypia (FEA) and columnar cell change with associated microcalcifications. -2:00 (N11cm) with tophat shaped clip placement: pathology benign, columnar cell change with associated microcalcifications, PASH. The pathology results are concordant with imaging findings. Recommendation- Surgical consultation of the left breast(s) is recommended. 3/4/2024 Bilateral Breast MRI: ([facility]) Clinical Indication- 58 years old female status post two benign stereotactic biopsies of calcifications in the left breast in January 2024 at 9:00 8 cm from the nipple revealing ADH and FEA (hourglass shaped clip) and at 2:00 11 cm from the nipple revealing columnar cell change and PASH (top hat shaped clip). Patient now presents for breast MRI for presurgical planning. No personal or family history of breast cancer. Right- The breast parenchyma is composed of heterogenous fibroglandular tissue.   The breasts demonstrate moderate background parenchymal enhancement. This lowers the sensitivity of the breast MRI for detection of small enhancing lesions. There are numerous scattered enhancing nonspecific foci.  There is no suspicious enhancement in the right breast. Left- Biopsy clip susceptibility artifact in the posterior inner left breast with adjacent progressive non-mass enhancement measuring approximately 1.5 cm and corresponding to the site of biopsy-proven ADH and FEA marked by an hourglass-shaped clip. Biopsy clip susceptibility artifact in the posterior upper outer left breast with associated nonmass enhancement measuring approximately 1.5 cm and corresponding to the site of the benign biopsy marked by a top hat-shaped clip. There are numerous scattered enhancing nonspecific foci.  There is no suspicious enhancement in the left breast. Axilla/other- There is no significant axillary or internal mammary lymphadenopathy. Impression- Moderate background parenchymal enhancement with bilateral enhancing non-specific foci. No suspicious enhancement visualized in either breast. Biopsy clip in the posterior inner left breast with adjacent 1.5 cm non-mass enhancement, corresponding to the site of biopsy-proven ADH and FEA marked by an hourglass-shaped clip. Continued surgical management is recommended. Biopsy clip in the posterior upper outer left breast corresponding to a benign biopsy site with associated 1.5 cm nonmass enhancement. Recommendation- Surgical or oncologic management. BI-RADS 2.  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 The DCIS measures 3.5 mm in greatest dimension and is present in 3 of 12 slides. No invasive carcinoma or lymphovascular invasion identified. The resection margins are negative for carcinoma. DCIS is 5.0mm from the nearest margin (anterior). Atypical ductal hyperplasia. Focal atypical lobular hyperplasia. Microscopic mucocele-like lesion, completely excised. Fibrocystic changes, columnar cell change, usual ductal hyperplasia, apocrine metaplasia and benign epithelial calcifications. Biopsy site changes with focus consistent with displaced squamous epithelium. ER+(%)/[place]+(%) Path addendum: Upon microscopic evaluation of the remaining tissue sections, 4 of 10 additional slides contain minute foci of DCIS.  DCIS is present in 7 of 22 sites.  One of the additional foci of DCIS is 0.3 cm from the nearest medial margin. Med ONC: Dr. Montoya. Last saw 4/17/2024. started Anastrozole 4/30/2024. She sees her again 8/1/2024. Rad ONC: Dr. Cash. Saw her 4/22/2024. DCISionRT score was low, no adjuvant radiation therapy. She is feeling well.

## 2024-07-15 ENCOUNTER — APPOINTMENT (OUTPATIENT)
Dept: OBGYN | Facility: CLINIC | Age: 59
End: 2024-07-15
Payer: COMMERCIAL

## 2024-07-15 VITALS
BODY MASS INDEX: 22.33 KG/M2 | HEIGHT: 65 IN | HEART RATE: 62 BPM | DIASTOLIC BLOOD PRESSURE: 80 MMHG | SYSTOLIC BLOOD PRESSURE: 138 MMHG | WEIGHT: 134 LBS | OXYGEN SATURATION: 96 % | TEMPERATURE: 97.6 F

## 2024-07-15 DIAGNOSIS — D05.12 INTRADUCTAL CARCINOMA IN SITU OF LEFT BREAST: ICD-10-CM

## 2024-07-15 PROCEDURE — 99213 OFFICE O/P EST LOW 20 MIN: CPT

## 2024-07-25 ENCOUNTER — OUTPATIENT (OUTPATIENT)
Dept: OUTPATIENT SERVICES | Facility: HOSPITAL | Age: 59
LOS: 1 days | Discharge: HOME | End: 2024-07-25

## 2024-07-25 DIAGNOSIS — C50.919 MALIGNANT NEOPLASM OF UNSPECIFIED SITE OF UNSPECIFIED FEMALE BREAST: ICD-10-CM

## 2024-07-26 ENCOUNTER — RESULT REVIEW (OUTPATIENT)
Age: 59
End: 2024-07-26

## 2024-07-26 ENCOUNTER — APPOINTMENT (OUTPATIENT)
Dept: MAMMOGRAPHY | Facility: CLINIC | Age: 59
End: 2024-07-26
Payer: COMMERCIAL

## 2024-07-26 ENCOUNTER — APPOINTMENT (OUTPATIENT)
Dept: ULTRASOUND IMAGING | Facility: CLINIC | Age: 59
End: 2024-07-26
Payer: COMMERCIAL

## 2024-07-26 PROCEDURE — 76641 ULTRASOUND BREAST COMPLETE: CPT | Mod: 50

## 2024-07-26 PROCEDURE — G0279: CPT

## 2024-07-26 PROCEDURE — 77066 DX MAMMO INCL CAD BI: CPT

## 2024-07-31 ENCOUNTER — NON-APPOINTMENT (OUTPATIENT)
Age: 59
End: 2024-07-31

## 2024-08-01 ENCOUNTER — APPOINTMENT (OUTPATIENT)
Dept: HEMATOLOGY ONCOLOGY | Facility: CLINIC | Age: 59
End: 2024-08-01
Payer: COMMERCIAL

## 2024-08-01 VITALS
RESPIRATION RATE: 16 BRPM | DIASTOLIC BLOOD PRESSURE: 80 MMHG | OXYGEN SATURATION: 99 % | HEART RATE: 63 BPM | SYSTOLIC BLOOD PRESSURE: 128 MMHG | WEIGHT: 136.55 LBS | TEMPERATURE: 98.1 F | BODY MASS INDEX: 22.72 KG/M2

## 2024-08-01 DIAGNOSIS — M85.80 OTHER SPECIFIED DISORDERS OF BONE DENSITY AND STRUCTURE, UNSPECIFIED SITE: ICD-10-CM

## 2024-08-01 DIAGNOSIS — D05.12 INTRADUCTAL CARCINOMA IN SITU OF LEFT BREAST: ICD-10-CM

## 2024-08-01 PROCEDURE — 99214 OFFICE O/P EST MOD 30 MIN: CPT

## 2024-08-01 PROCEDURE — G2211 COMPLEX E/M VISIT ADD ON: CPT

## 2024-08-01 NOTE — PHYSICAL EXAM
[Fully active, able to carry on all pre-disease performance without restriction] : Status 0 - Fully active, able to carry on all pre-disease performance without restriction [Normal] : bilateral breasts without nipple retraction, skin dimpling or palpable masses; the bilateral axillae are without adenopathy [de-identified] : Well healed left periareolar incision

## 2024-08-01 NOTE — HISTORY OF PRESENT ILLNESS
Awake [Disease: _____________________] : Disease: [unfilled] [T: ___] : T[unfilled] [N: ___] : N[unfilled] [M: ___] : M[unfilled] [AJCC Stage: ____] : AJCC Stage: [unfilled] [de-identified] : Left breast DCIS diagnosed at age 58. 6/29/2023 Bilateral Mammogram: ([facility]) Clinical Indication- Annual examination and short interval follow-up of probably benign left breast calcifications. No suspicious       masses, calcifications or other findings are seen in either breast. 6/29/2023 Bilateral Breast Ultrasound: In the left breast at 2:00 there is 5 mm clustered macrocysts. No suspicious abnormalities were seen sonographically in either breast. A 6       month follow-up mammogram of the left breast is recommended. BI-RADS 3. 12/29/2023 Diagnostic [facility]: [facility] Indeterminate calcifications in the upper outer and central left breast. A representative 2 site stereotactic guided biopsy       is recommended. BI-RADS 4. 1/8/2024 Left stereotactic biopsies x 2 ([facility]):      9:00 (N8cm) with hourglass shaped clip placement: pathology reveals focal atypical ductal hyperplasia in a background of flat epithelial atypia (FEA) and columnar cell change          with associated microcalcifications.       2:00 (N11cm) with tophat shaped clip placement: pathology benign, columnar cell change with associated microcalcifications, PASH. The pathology results are concordant          with imaging findings.  Surgical consultation of the left breast is recommended. 3/4/2024 Bilateral Breast MRI: ([facility]) There is no suspicious enhancement in the right breast. Biopsy clip susceptibility artifact in the posterior inner left breast with adjacent      progressive non-mass enhancement measuring approximately 1.5 cm and corresponding to the site of biopsy-proven ADH and FEA marked by an hourglass-shaped clip.      Biopsy clip susceptibility artifact in the posterior upper outer left breast with associated nonmass enhancement measuring approximately 1.5 cm and corresponding to the site      of the benign biopsy marked by a top hat-shaped clip. There are numerous scattered enhancing nonspecific foci. There is no suspicious enhancement in the left breast. There      is no significant axillary or internal mammary lymphadenopathy.  3/27/2024 Left 9:00 excisional breast biopsy by Dr. 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      The DCIS measures 3.5 mm in greatest dimension and is present in 3 of 12 slides. No invasive carcinoma or lymphovascular invasion identified. The resection margins are      negative for carcinoma. DCIS is 5.0mm from the nearest margin (anterior). Atypical ductal hyperplasia. Focal atypical lobular hyperplasia. ER %, [place] %.     Path addendum: Upon microscopic evaluation of the remaining tissue sections, 4 of 10 additional slides contain minute foci of DCIS. DCIS is present in 7 of 22 sites. One of      the additional foci of DCIS is 0.3 cm from the nearest medial margin. 4/2024 DCISionRT score was low = 0.9 indicating no predicted benefit from radiation so the patient opted for no radiation. 4/2024 Anastrozole started [de-identified] : DCIS, intermediate nuclear grade, cribriform type with necrosis and calcifications, ER %, [place] % [de-identified] : Tevin opted not to have adjuvant radiation as her DCISionRT score was low = 0.9 indicating no predicted benefit from radiation. She started anastrozole in 4/24 and is tolerating it well aside from mild AM stiffness and mild arthralgias. She is active and walks or works out most mornings. She denies increased hot flashes or vaginal dryness.  HEALTH MAINTENANCE: PCP: Dr. David Overton Nephrology: Dr. Uvaldo Garduno GYN: Dr. Fermín Bearden Mammogram & sonogram: 6/29/23 Bilateral and 12/23 Left Pap Smear: 1/29/24 negative Bone density: 1/2024 at NR showed T scores of 1.4 in spin, -1.5 in femoral neck and -1.1 in total hip Colonoscopy: 7/2023 with Dr. Roldan Bueno showed 2 benign polyps, a 4 mm sessile polyp in the transverse colon and a diminutive sessile polyp in the sigmoid colon

## 2024-08-01 NOTE — PHYSICAL EXAM
[Fully active, able to carry on all pre-disease performance without restriction] : Status 0 - Fully active, able to carry on all pre-disease performance without restriction [Normal] : bilateral breasts without nipple retraction, skin dimpling or palpable masses; the bilateral axillae are without adenopathy [de-identified] : Well healed left periareolar incision

## 2024-08-01 NOTE — HISTORY OF PRESENT ILLNESS
[Disease: _____________________] : Disease: [unfilled] [T: ___] : T[unfilled] [N: ___] : N[unfilled] [M: ___] : M[unfilled] [AJCC Stage: ____] : AJCC Stage: [unfilled] [de-identified] : Left breast DCIS diagnosed at age 58. 6/29/2023 Bilateral Mammogram: ([facility]) Clinical Indication- Annual examination and short interval follow-up of probably benign left breast calcifications. No suspicious       masses, calcifications or other findings are seen in either breast. 6/29/2023 Bilateral Breast Ultrasound: In the left breast at 2:00 there is 5 mm clustered macrocysts. No suspicious abnormalities were seen sonographically in either breast. A 6       month follow-up mammogram of the left breast is recommended. BI-RADS 3. 12/29/2023 [facility] Indeterminate calcifications in the upper outer and central left breast. A representative 2 site stereotactic guided biopsy       is recommended. BI-RADS 4. 1/8/2024 Left stereotactic biopsies x 2 ([facility]):      9:00 (N8cm) with hourglass shaped clip placement: pathology reveals focal atypical ductal hyperplasia in a background of flat epithelial atypia (FEA) and columnar cell change          with associated microcalcifications.       2:00 (N11cm) with tophat shaped clip placement: pathology benign, columnar cell change with associated microcalcifications, PASH. The pathology results are concordant          with imaging findings.  Surgical consultation of the left breast is recommended. 3/4/2024 Bilateral Breast MRI: ([facility]) There is no suspicious enhancement in the right breast. Biopsy clip susceptibility artifact in the posterior inner left breast with adjacent      progressive non-mass enhancement measuring approximately 1.5 cm and corresponding to the site of biopsy-proven ADH and FEA marked by an hourglass-shaped clip.      Biopsy clip susceptibility artifact in the posterior upper outer left breast with associated nonmass enhancement measuring approximately 1.5 cm and corresponding to the site      of the benign biopsy marked by a top hat-shaped clip. There are numerous scattered enhancing nonspecific foci. There is no suspicious enhancement in the left breast. There      is no significant axillary or internal mammary lymphadenopathy.  3/27/2024 Left 9:00 excisional breast biopsy by Dr. 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      The DCIS measures 3.5 mm in greatest dimension and is present in 3 of 12 slides. No invasive carcinoma or lymphovascular invasion identified. The resection margins are      negative for carcinoma. DCIS is 5.0mm from the nearest margin (anterior). Atypical ductal hyperplasia. Focal atypical lobular hyperplasia. ER %, [place] %.     Path addendum: Upon microscopic evaluation of the remaining tissue sections, 4 of 10 additional slides contain minute foci of DCIS. DCIS is present in 7 of 22 sites. One of      the additional foci of DCIS is 0.3 cm from the nearest medial margin. 4/2024 DCISionRT score was low = 0.9 indicating no predicted benefit from radiation so the patient opted for no radiation. 4/2024 Anastrozole started [de-identified] : DCIS, intermediate nuclear grade, cribriform type with necrosis and calcifications, ER %, [place] % [de-identified] : Tevin opted not to have adjuvant radiation as her DCISionRT score was low = 0.9 indicating no predicted benefit from radiation. She started anastrozole in 4/24 and is tolerating it well aside from mild AM stiffness and mild arthralgias. She is active and walks or works out most mornings. She denies increased hot flashes or vaginal dryness.  HEALTH MAINTENANCE: PCP: Dr. David Overton Nephrology: Dr. Uvaldo Garduno GYN: Dr. Fermín Bearden Mammogram & sonogram: 6/29/23 Bilateral and 12/23 Left Pap Smear: 1/29/24 negative Bone density: 1/2024 at NR showed T scores of 1.4 in spin, -1.5 in femoral neck and -1.1 in total hip Colonoscopy: 7/2023 with Dr. Roldan Bueno showed 2 benign polyps, a 4 mm sessile polyp in the transverse colon and a diminutive sessile polyp in the sigmoid colon

## 2024-08-15 ENCOUNTER — NON-APPOINTMENT (OUTPATIENT)
Age: 59
End: 2024-08-15

## 2024-09-16 ENCOUNTER — APPOINTMENT (OUTPATIENT)
Dept: PAIN MANAGEMENT | Facility: CLINIC | Age: 59
End: 2024-09-16

## 2024-10-29 PROBLEM — R92.0 MAMMOGRAPHIC MICROCALCIFICATION: Status: ACTIVE | Noted: 2024-10-29

## 2024-10-30 ENCOUNTER — APPOINTMENT (OUTPATIENT)
Dept: PLASTIC SURGERY | Facility: CLINIC | Age: 59
End: 2024-10-30
Payer: COMMERCIAL

## 2024-10-30 VITALS
BODY MASS INDEX: 22.16 KG/M2 | HEIGHT: 65 IN | DIASTOLIC BLOOD PRESSURE: 88 MMHG | WEIGHT: 133 LBS | TEMPERATURE: 98 F | HEART RATE: 71 BPM | OXYGEN SATURATION: 99 % | SYSTOLIC BLOOD PRESSURE: 130 MMHG

## 2024-10-30 DIAGNOSIS — R92.0 MAMMOGRAPHIC MICROCALCIFICATION FOUND ON DIAGNOSTIC IMAGING OF BREAST: ICD-10-CM

## 2024-10-30 DIAGNOSIS — D05.12 INTRADUCTAL CARCINOMA IN SITU OF LEFT BREAST: ICD-10-CM

## 2024-10-30 PROCEDURE — 99213 OFFICE O/P EST LOW 20 MIN: CPT

## 2025-02-07 ENCOUNTER — OUTPATIENT (OUTPATIENT)
Dept: OUTPATIENT SERVICES | Facility: HOSPITAL | Age: 60
LOS: 1 days | Discharge: HOME | End: 2025-02-07

## 2025-02-07 DIAGNOSIS — C50.919 MALIGNANT NEOPLASM OF UNSPECIFIED SITE OF UNSPECIFIED FEMALE BREAST: ICD-10-CM

## 2025-02-11 ENCOUNTER — NON-APPOINTMENT (OUTPATIENT)
Age: 60
End: 2025-02-11

## 2025-02-11 ENCOUNTER — APPOINTMENT (OUTPATIENT)
Dept: HEMATOLOGY ONCOLOGY | Facility: CLINIC | Age: 60
End: 2025-02-11
Payer: COMMERCIAL

## 2025-02-11 VITALS
TEMPERATURE: 98 F | BODY MASS INDEX: 21.96 KG/M2 | SYSTOLIC BLOOD PRESSURE: 106 MMHG | OXYGEN SATURATION: 100 % | HEIGHT: 65.16 IN | DIASTOLIC BLOOD PRESSURE: 67 MMHG | RESPIRATION RATE: 15 BRPM | HEART RATE: 59 BPM | WEIGHT: 133.38 LBS

## 2025-02-11 DIAGNOSIS — D05.12 INTRADUCTAL CARCINOMA IN SITU OF LEFT BREAST: ICD-10-CM

## 2025-02-11 DIAGNOSIS — M85.80 OTHER SPECIFIED DISORDERS OF BONE DENSITY AND STRUCTURE, UNSPECIFIED SITE: ICD-10-CM

## 2025-02-11 PROCEDURE — G2211 COMPLEX E/M VISIT ADD ON: CPT | Mod: NC

## 2025-02-11 PROCEDURE — 99214 OFFICE O/P EST MOD 30 MIN: CPT

## 2025-02-25 ENCOUNTER — APPOINTMENT (OUTPATIENT)
Dept: MAMMOGRAPHY | Facility: CLINIC | Age: 60
End: 2025-02-25
Payer: COMMERCIAL

## 2025-02-25 ENCOUNTER — APPOINTMENT (OUTPATIENT)
Dept: ULTRASOUND IMAGING | Facility: CLINIC | Age: 60
End: 2025-02-25

## 2025-02-25 ENCOUNTER — RESULT REVIEW (OUTPATIENT)
Age: 60
End: 2025-02-25

## 2025-02-25 PROCEDURE — 77065 DX MAMMO INCL CAD UNI: CPT | Mod: LT

## 2025-02-25 PROCEDURE — G0279: CPT | Mod: LT

## 2025-02-28 ENCOUNTER — TRANSCRIPTION ENCOUNTER (OUTPATIENT)
Age: 60
End: 2025-02-28

## 2025-06-06 ENCOUNTER — APPOINTMENT (OUTPATIENT)
Dept: PLASTIC SURGERY | Facility: CLINIC | Age: 60
End: 2025-06-06
Payer: COMMERCIAL

## 2025-06-06 VITALS
SYSTOLIC BLOOD PRESSURE: 123 MMHG | BODY MASS INDEX: 22.16 KG/M2 | HEART RATE: 60 BPM | HEIGHT: 65 IN | DIASTOLIC BLOOD PRESSURE: 81 MMHG | WEIGHT: 133 LBS | OXYGEN SATURATION: 100 %

## 2025-06-06 PROCEDURE — 99213 OFFICE O/P EST LOW 20 MIN: CPT

## 2025-07-29 ENCOUNTER — APPOINTMENT (OUTPATIENT)
Dept: MAMMOGRAPHY | Facility: CLINIC | Age: 60
End: 2025-07-29
Payer: COMMERCIAL

## 2025-07-29 ENCOUNTER — RESULT REVIEW (OUTPATIENT)
Age: 60
End: 2025-07-29

## 2025-07-29 PROCEDURE — 77066 DX MAMMO INCL CAD BI: CPT

## 2025-07-29 PROCEDURE — G0279: CPT

## 2025-08-11 ENCOUNTER — APPOINTMENT (OUTPATIENT)
Dept: OBGYN | Facility: CLINIC | Age: 60
End: 2025-08-11
Payer: COMMERCIAL

## 2025-08-11 VITALS
OXYGEN SATURATION: 98 % | DIASTOLIC BLOOD PRESSURE: 90 MMHG | HEART RATE: 57 BPM | WEIGHT: 136 LBS | BODY MASS INDEX: 22.66 KG/M2 | SYSTOLIC BLOOD PRESSURE: 138 MMHG | HEIGHT: 65 IN

## 2025-08-11 DIAGNOSIS — D05.12 INTRADUCTAL CARCINOMA IN SITU OF LEFT BREAST: ICD-10-CM

## 2025-08-11 DIAGNOSIS — N95.0 POSTMENOPAUSAL BLEEDING: ICD-10-CM

## 2025-08-11 DIAGNOSIS — D25.9 LEIOMYOMA OF UTERUS, UNSPECIFIED: ICD-10-CM

## 2025-08-11 DIAGNOSIS — Z01.419 ENCOUNTER FOR GYNECOLOGICAL EXAMINATION (GENERAL) (ROUTINE) W/OUT ABNORMAL FINDINGS: ICD-10-CM

## 2025-08-11 PROCEDURE — 99212 OFFICE O/P EST SF 10 MIN: CPT | Mod: 25

## 2025-08-11 PROCEDURE — 99396 PREV VISIT EST AGE 40-64: CPT

## 2025-08-14 LAB
CYTOLOGY CVX/VAG DOC THIN PREP: ABNORMAL
HPV HIGH+LOW RISK DNA PNL CVX: NOT DETECTED

## 2025-08-15 LAB
A VAGINAE DNA VAG QL NAA+PROBE: NORMAL
BVAB2 DNA VAG QL NAA+PROBE: NORMAL
C KRUSEI DNA VAG QL NAA+PROBE: NEGATIVE
C KRUSEI DNA VAG QL NAA+PROBE: NEGATIVE
M GENITALIUM DNA SPEC QL NAA+PROBE: NEGATIVE
M HOMINIS DNA SPEC QL NAA+PROBE: NEGATIVE
MEGA1 DNA VAG QL NAA+PROBE: NORMAL
T VAGINALIS RRNA SPEC QL NAA+PROBE: NEGATIVE
UREAPLASMA DNA SPEC QL NAA+PROBE: NEGATIVE

## 2025-08-25 ENCOUNTER — APPOINTMENT (OUTPATIENT)
Dept: ULTRASOUND IMAGING | Facility: CLINIC | Age: 60
End: 2025-08-25
Payer: COMMERCIAL

## 2025-08-25 PROCEDURE — 76830 TRANSVAGINAL US NON-OB: CPT

## 2025-08-25 PROCEDURE — 76856 US EXAM PELVIC COMPLETE: CPT

## 2025-08-28 ENCOUNTER — APPOINTMENT (OUTPATIENT)
Dept: HEMATOLOGY ONCOLOGY | Facility: CLINIC | Age: 60
End: 2025-08-28
Payer: COMMERCIAL

## 2025-08-28 VITALS
RESPIRATION RATE: 16 BRPM | HEART RATE: 64 BPM | WEIGHT: 136 LBS | TEMPERATURE: 97.3 F | OXYGEN SATURATION: 95 % | BODY MASS INDEX: 22.63 KG/M2 | SYSTOLIC BLOOD PRESSURE: 122 MMHG | DIASTOLIC BLOOD PRESSURE: 79 MMHG

## 2025-08-28 DIAGNOSIS — N04.0 NEPHROTIC SYNDROME WITH MINOR GLOMERULAR ABNORMALITY: ICD-10-CM

## 2025-08-28 DIAGNOSIS — D05.12 INTRADUCTAL CARCINOMA IN SITU OF LEFT BREAST: ICD-10-CM

## 2025-08-28 DIAGNOSIS — M85.80 OTHER SPECIFIED DISORDERS OF BONE DENSITY AND STRUCTURE, UNSPECIFIED SITE: ICD-10-CM

## 2025-08-28 PROCEDURE — G2211 COMPLEX E/M VISIT ADD ON: CPT | Mod: NC

## 2025-08-28 PROCEDURE — 99214 OFFICE O/P EST MOD 30 MIN: CPT

## 2025-08-28 RX ORDER — CALCIUM CARBONATE/VITAMIN D3 600 MG-10
600-10 TABLET ORAL
Refills: 0 | Status: ACTIVE | COMMUNITY
Start: 2025-08-28

## (undated) DEVICE — PREP BETADINE KIT

## (undated) DEVICE — SUT SURGIPRO II 3-0 18" C-14

## (undated) DEVICE — DRSG COMBINE 5X9"

## (undated) DEVICE — GAMMA SLEEVE DISPOSABLE

## (undated) DEVICE — SOL IRR POUR NS 0.9% 500ML

## (undated) DEVICE — SPONGE PEANUT REGULAR 3/8"

## (undated) DEVICE — MEDICATION LABELS W MARKER

## (undated) DEVICE — SOL IRR POUR H2O 250ML

## (undated) DEVICE — SHEATH SURG GUIDE SCOUT DISP STRL

## (undated) DEVICE — LAP PAD 18 X 18"

## (undated) DEVICE — SUT POLYSORB 3-0 30" V-20 UNDYED

## (undated) DEVICE — SUT POLYSORB 4-0 18" P-12 UNDYED

## (undated) DEVICE — DRAPE INSTRUMENT POUCH 6.75" X 11"

## (undated) DEVICE — DRAPE TOWEL BLUE 17" X 24"

## (undated) DEVICE — POSITIONER PATIENT SAFETY STRAP 3X60"

## (undated) DEVICE — SPECIMEN CONTAINER 100ML

## (undated) DEVICE — SAVI SCOUT HANDPIECE

## (undated) DEVICE — VENODYNE/SCD SLEEVE CALF MEDIUM

## (undated) DEVICE — SUT SOFSILK 2-0 18" C-23

## (undated) DEVICE — WARMING BLANKET LOWER ADULT

## (undated) DEVICE — PACK MINOR

## (undated) DEVICE — DRSG STERISTRIPS 0.5 X 4"